# Patient Record
Sex: MALE
[De-identification: names, ages, dates, MRNs, and addresses within clinical notes are randomized per-mention and may not be internally consistent; named-entity substitution may affect disease eponyms.]

---

## 2018-05-09 ENCOUNTER — CHARTING TRANS (OUTPATIENT)
Dept: OTHER | Age: 23
End: 2018-05-09

## 2018-05-09 ENCOUNTER — MYAURORA ACCOUNT LINK (OUTPATIENT)
Dept: OTHER | Age: 23
End: 2018-05-09

## 2018-05-09 ENCOUNTER — LAB SERVICES (OUTPATIENT)
Dept: OTHER | Age: 23
End: 2018-05-09

## 2018-05-14 LAB
ALBUMIN SERPL-MCNC: 4.4 G/DL (ref 3.6–5.1)
ALBUMIN/GLOB SERPL: 1.1 (ref 1–2.4)
ALP SERPL-CCNC: 75 UNITS/L (ref 45–117)
ALT SERPL-CCNC: 159 UNITS/L
ANION GAP SERPL CALC-SCNC: 13 MMOL/L (ref 10–20)
AST SERPL-CCNC: 58 UNITS/L
BILIRUB SERPL-MCNC: 1.9 MG/DL (ref 0.2–1)
BUN SERPL-MCNC: 7 MG/DL (ref 6–20)
BUN/CREAT SERPL: 9 (ref 7–25)
CALCIUM SERPL-MCNC: 10 MG/DL (ref 8.4–10.2)
CHLORIDE SERPL-SCNC: 105 MMOL/L (ref 98–107)
CO2 SERPL-SCNC: 27 MMOL/L (ref 21–32)
CREAT SERPL-MCNC: 0.81 MG/DL (ref 0.67–1.17)
GLOBULIN SER-MCNC: 4 G/DL (ref 2–4)
GLUCOSE SERPL-MCNC: 91 MG/DL (ref 65–99)
HBA1C MFR BLD: 6.5 % (ref 4.5–5.6)
LENGTH OF FAST TIME PATIENT: 12 HRS
POTASSIUM SERPL-SCNC: 4.8 MMOL/L (ref 3.4–5.1)
SODIUM SERPL-SCNC: 140 MMOL/L (ref 135–145)
TOTAL PROTEIN: 8.4 G/DL (ref 6.4–8.2)
TSH SERPL-ACNC: 1.06 MCUNITS/ML (ref 0.35–5)

## 2018-11-01 VITALS
DIASTOLIC BLOOD PRESSURE: 91 MMHG | HEIGHT: 73 IN | WEIGHT: 313.44 LBS | TEMPERATURE: 99.3 F | HEART RATE: 99 BPM | OXYGEN SATURATION: 96 % | SYSTOLIC BLOOD PRESSURE: 148 MMHG | BODY MASS INDEX: 41.54 KG/M2 | RESPIRATION RATE: 18 BRPM

## 2019-02-01 ENCOUNTER — OFFICE VISIT (OUTPATIENT)
Dept: SURGERY | Facility: CLINIC | Age: 24
End: 2019-02-01

## 2019-02-01 VITALS
SYSTOLIC BLOOD PRESSURE: 128 MMHG | BODY MASS INDEX: 33.31 KG/M2 | HEIGHT: 62 IN | WEIGHT: 181 LBS | DIASTOLIC BLOOD PRESSURE: 78 MMHG | HEART RATE: 72 BPM | RESPIRATION RATE: 16 BRPM

## 2019-02-01 DIAGNOSIS — D17.1 LIPOMA OF TORSO: Primary | ICD-10-CM

## 2019-02-01 PROCEDURE — 11404 EXC TR-EXT B9+MARG 3.1-4 CM: CPT | Performed by: SURGERY

## 2019-02-01 PROCEDURE — 11402 EXC TR-EXT B9+MARG 1.1-2 CM: CPT | Performed by: SURGERY

## 2019-02-01 NOTE — PROGRESS NOTES
"Chris Horner 23 y.o. male presents as a self ref for eval mass RIGHT abd, RIGHT back, RIGHT buttock, and LEFT groin.  Pt denies pain, drainage, and erythema. NO PCP.   Chief Complaint   Patient presents with   • Mass     RIGHTabd, RIGHT back, RIGHT buttock, LEFT groin             HPI     Above noted and agree.    Review of Systems        No current outpatient medications on file.        No Known Allergies        History reviewed. No pertinent past medical history.        History reviewed. No pertinent surgical history.        Social History     Tobacco Use   • Smoking status: Never Smoker   • Smokeless tobacco: Never Used   Substance Use Topics   • Alcohol use: No   • Drug use: Defer             There is no immunization history on file for this patient.        Physical Exam    I discussed with the patient the benefits and risks of excising this lesion.   Risks not limited to but including bleeding, infection, having to excise more if the lesion turns out to be cancer.  The patient appeared to understand and signed informed consent.  The area was prepped and draped in the usual sterile fashion.  Local was injected into the area to be excised.  The lesion was then excised.  Hemostasis was perfected.  The wound was then closed using simple sutures.  Sterile dressings were applied.  The patient tolerated the procedure well without complication.  Wound care instructions were then given to the patient.  We excised 2 lipomas of the abdominal wall.  The first one measured 4 cm x 3 cm x 2 cm.  The other one measured 2 cm x 1 cm x 1 cm.    Debilities/Disabilities Identified: None    Emotional Behavior: Appropriate      /78   Pulse 72   Resp 16   Ht 157.5 cm (62\")   Wt 82.1 kg (181 lb)   BMI 33.11 kg/m²         Chris was seen today for mass.    Diagnoses and all orders for this visit:    Lipoma of torso    We will remove the sutures next week.      "

## 2019-02-08 ENCOUNTER — PROCEDURE VISIT (OUTPATIENT)
Dept: SURGERY | Facility: CLINIC | Age: 24
End: 2019-02-08

## 2019-02-08 DIAGNOSIS — Z86.018 STATUS POST EXCISION OF LIPOMA: Primary | ICD-10-CM

## 2019-02-08 DIAGNOSIS — Z98.890 STATUS POST EXCISION OF LIPOMA: Primary | ICD-10-CM

## 2019-02-08 PROCEDURE — 99024 POSTOP FOLLOW-UP VISIT: CPT | Performed by: SURGERY

## 2019-02-08 NOTE — PROGRESS NOTES
Chris Horner 23 y.o. male presents for PO FU exc mass abd.  Pt states he does not want other liopomas removed today.       HPI     Above noted and agree.    Review of Systems        No past medical history on file.        No past surgical history on file.        Physical Exam    Wounds healed.  Sutures removed.    There were no vitals taken for this visit.        Chris was seen today for suture / staple removal.    Diagnoses and all orders for this visit:    Status post excision of lipoma      Return to clinic as needed.  Thank you for allowing me to participate in the care of this interesting patient.

## 2020-01-31 ENCOUNTER — OFFICE VISIT (OUTPATIENT)
Dept: SURGERY | Facility: CLINIC | Age: 25
End: 2020-01-31

## 2020-01-31 VITALS
RESPIRATION RATE: 16 BRPM | HEART RATE: 72 BPM | SYSTOLIC BLOOD PRESSURE: 124 MMHG | DIASTOLIC BLOOD PRESSURE: 78 MMHG | HEIGHT: 62 IN | WEIGHT: 179 LBS | BODY MASS INDEX: 32.94 KG/M2

## 2020-01-31 DIAGNOSIS — D17.9 MULTIPLE LIPOMAS: Primary | ICD-10-CM

## 2020-01-31 PROCEDURE — 27047 EXC HIP/PELVIS LES SC < 3 CM: CPT | Performed by: SURGERY

## 2020-01-31 PROCEDURE — 27327 EXC THIGH/KNEE LES SC < 3 CM: CPT | Performed by: SURGERY

## 2020-01-31 NOTE — PROGRESS NOTES
"Chris Horner 24 y.o. male presents as a self ref for eval 2 masses on back, 1 mass LEFT thigh, and 1 mass RIGHT buttock. Pt's wife@ [employer] for translation.  Pt reports + increase in size, + pain.   NO PCP.   Chief Complaint   Patient presents with   • Mass     2 on back, 1 LEFT thigh,1 RIGHT buttock             HPI     We discussed excising the thigh lipoma and buttock lipoma.    Review of Systems        No current outpatient medications on file.        No Known Allergies        No past medical history on file.        No past surgical history on file.        Social History     Tobacco Use   • Smoking status: Never Smoker   • Smokeless tobacco: Never Used   Substance Use Topics   • Alcohol use: No   • Drug use: Defer             There is no immunization history on file for this patient.        Physical Exam     I discussed with the patient the benefits and risks of excising this lesion.   Risks not limited to but including bleeding, infection, having to excise more if the lesion turns out to be cancer.  The patient appeared to understand and signed informed consent.  The area was prepped and draped in the usual sterile fashion.  Local was injected into the area to be excised.  The lesion was then excised.  Hemostasis was perfected.  The wound was then closed using simple sutures.  Sterile dressings were applied.  The patient tolerated the procedure well without complication.  Wound care instructions were then given to the patient.  The right buttock lipoma measured 1 cm x 1 cm x 1 cm.  The left thigh lipoma measured 1.5 cm x 1 cm x 1 cm.    Debilities/Disabilities Identified: None    Emotional Behavior: Appropriate      /78   Pulse 72   Resp 16   Ht 157.5 cm (62\")   Wt 81.2 kg (179 lb)   BMI 32.74 kg/m²         Chris was seen today for mass.    Diagnoses and all orders for this visit:    Multiple lipomas    We will excise the back lipomas next week.        "

## 2020-02-07 ENCOUNTER — PROCEDURE VISIT (OUTPATIENT)
Dept: SURGERY | Facility: CLINIC | Age: 25
End: 2020-02-07

## 2020-02-07 VITALS
SYSTOLIC BLOOD PRESSURE: 118 MMHG | BODY MASS INDEX: 32.94 KG/M2 | HEIGHT: 62 IN | RESPIRATION RATE: 16 BRPM | WEIGHT: 179 LBS | DIASTOLIC BLOOD PRESSURE: 74 MMHG | HEART RATE: 72 BPM

## 2020-02-07 DIAGNOSIS — D17.9 MULTIPLE LIPOMAS: Primary | ICD-10-CM

## 2020-02-07 PROCEDURE — 11402 EXC TR-EXT B9+MARG 1.1-2 CM: CPT | Performed by: SURGERY

## 2020-02-07 PROCEDURE — 11401 EXC TR-EXT B9+MARG 0.6-1 CM: CPT | Performed by: SURGERY

## 2020-02-07 NOTE — PROGRESS NOTES
"Chris Horner 24 y.o. male presents for EXCISION LIPOMA X 2 ON BACK.  NO PCP.      HPI   Above-noted and agree.      Review of Systems        No past medical history on file.        No past surgical history on file.        Physical Exam     I discussed with the patient the benefits and risks of excising this lesion.   Risks not limited to but including bleeding, infection, having to excise more if the lesion turns out to be cancer.  The patient appeared to understand and signed informed consent.  The area was prepped and draped in the usual sterile fashion.  Local was injected into the area to be excised.  The lesion was then excised.  Hemostasis was perfected.  The wound was then closed using simple sutures.  Sterile dressings were applied.  The patient tolerated the procedure well without complication.  Wound care instructions were then given to the patient.  There were 2 lipomas removed both on the back.  The first measured 1.2 cm x 1 cm x 1 cm.  The other measured 1 cm x 1 cm x 1 cm.        /74   Pulse 72   Resp 16   Ht 157.5 cm (62\")   Wt 81.2 kg (179 lb)   BMI 32.74 kg/m²         Chris was seen today for mass.    Diagnoses and all orders for this visit:    Multiple lipomas          "

## 2022-12-29 ENCOUNTER — TELEPHONE (OUTPATIENT)
Dept: SURGERY | Facility: CLINIC | Age: 27
End: 2022-12-29

## 2022-12-29 NOTE — TELEPHONE ENCOUNTER
Provider: DR GRANGER  Caller: CHUN PAZ  Relationship to Patient: SELF    Phone Number: [phone]    Reason for Call: CALLED PT TO SCHEDULE APPT PER THE REQUEST OF THE PATIENT'S GIRLFRIEND.    THE PATIENT'S GIRLFRIEND CALLED TO SCHEDULE BUT SHE WAS UNABLE TO VERIFY THE PATIENT'S DATE OF BIRTH AND ADDRESS. SHE DID VERIFY THE CORRECT PHONE NUMBER AND ASKED THAT WE CALL THE PATIENT WITH AN .    ATTEMPTED TO CONTACT PT WITH [facility] ID #[id number]. THERE WAS NO ANSWER AND WE WERE UNABLE TO LEAVE A MESSAGE.    PT LAST SAW DR GRANGER 2/7/2020 FOR LIPOMA.

## 2023-01-20 ENCOUNTER — OFFICE VISIT (OUTPATIENT)
Dept: SURGERY | Facility: CLINIC | Age: 28
End: 2023-01-20

## 2023-01-20 VITALS
WEIGHT: 179 LBS | HEART RATE: 72 BPM | HEIGHT: 62 IN | SYSTOLIC BLOOD PRESSURE: 110 MMHG | BODY MASS INDEX: 32.94 KG/M2 | DIASTOLIC BLOOD PRESSURE: 72 MMHG

## 2023-01-20 DIAGNOSIS — D17.9 MULTIPLE LIPOMAS: Primary | ICD-10-CM

## 2023-01-20 PROBLEM — E66.9 OBESITY (BMI 30.0-34.9): Status: ACTIVE | Noted: 2023-01-20

## 2023-01-20 PROCEDURE — 11401 EXC TR-EXT B9+MARG 0.6-1 CM: CPT | Performed by: SURGERY

## 2023-01-20 NOTE — PROGRESS NOTES
"Chris Horner 27 y.o. male presents as a self ref for eval 5 masses: LEFTLeft abd, Back , RIGHT back, LEFT abd, RIGHT abd, RIGHT posterior leg.  Wife is interpreting and states pt states they hurt when he works.   Chief Complaint   Patient presents with   • Mass     5 masses             HPI     Above noted and agree.    Review of Systems        No current outpatient medications on file.        No Known Allergies        No past medical history on file.        No past surgical history on file.        Social History     Tobacco Use   • Smoking status: Never   • Smokeless tobacco: Never   Substance Use Topics   • Alcohol use: No   • Drug use: Defer             There is no immunization history on file for this patient.        Physical Exam     I was only able to palpate two small lipomas.  One on his back and one on his posterior left thigh.    I discussed with the patient the benefits and risks of excising this lesion.   Risks not limited to but including bleeding, infection, having to excise more if the lesion turns out to be cancer.  The patient appeared to understand and signed informed consent.  The area was prepped and draped in the usual sterile fashion.  Local was injected into the area to be excised.  The lesion was then excised.  Hemostasis was perfected.  The wound was then closed using simple sutures.  Sterile dressings were applied.  The patient tolerated the procedure well without complication.  Wound care instructions were then given to the patient.  The posterior left thigh lesion measured 0.5 cm x 0.5 cm x 0.5 cm and was a lipoma.  The back lesion measured 1 cm x 1 cm x 1 cm and was also a lipoma.  The specimen will not be sent for pathology    Debilities/Disabilities Identified: None    Emotional Behavior: Appropriate      /72   Pulse 72   Ht 157.5 cm (62\")   Wt 81.2 kg (179 lb)   BMI 32.74 kg/m²         Diagnoses and all orders for this visit:    1. Multiple lipomas (Primary)    We will remove " the sutures next week.    Thank you for allowing me to participate in the care of this interesting patient.

## 2023-01-27 ENCOUNTER — OFFICE VISIT (OUTPATIENT)
Dept: SURGERY | Facility: CLINIC | Age: 28
End: 2023-01-27

## 2023-01-27 DIAGNOSIS — Z86.018 STATUS POST EXCISION OF LIPOMA: Primary | ICD-10-CM

## 2023-01-27 DIAGNOSIS — Z98.890 STATUS POST EXCISION OF LIPOMA: Primary | ICD-10-CM

## 2023-01-27 PROCEDURE — 99024 POSTOP FOLLOW-UP VISIT: CPT | Performed by: SURGERY

## 2023-01-27 NOTE — PROGRESS NOTES
Chris Horner 27 y.o. male presents for PO FU/suture removal.        HPI   Above noted and agree      Review of Systems        No past medical history on file.        No past surgical history on file.        Physical Exam    Wounds healed sutures removed    There were no vitals taken for this visit.        Diagnoses and all orders for this visit:    1. Status post excision of lipoma (Primary)    Chris may return anytime as needed.